# Patient Record
Sex: MALE | Race: WHITE | URBAN - METROPOLITAN AREA
[De-identification: names, ages, dates, MRNs, and addresses within clinical notes are randomized per-mention and may not be internally consistent; named-entity substitution may affect disease eponyms.]

---

## 2017-08-03 ENCOUNTER — HOSPITAL ENCOUNTER (EMERGENCY)
Facility: CLINIC | Age: 22
Discharge: HOME | End: 2017-08-03
Attending: EMERGENCY MEDICINE | Admitting: EMERGENCY MEDICINE
Payer: COMMERCIAL

## 2017-08-03 VITALS
HEIGHT: 70 IN | SYSTOLIC BLOOD PRESSURE: 127 MMHG | HEART RATE: 106 BPM | OXYGEN SATURATION: 100 % | WEIGHT: 200 LBS | TEMPERATURE: 99.1 F | BODY MASS INDEX: 28.63 KG/M2 | DIASTOLIC BLOOD PRESSURE: 72 MMHG | RESPIRATION RATE: 20 BRPM

## 2017-08-03 DIAGNOSIS — J02.9 PHARYNGITIS, UNSPECIFIED ETIOLOGY: ICD-10-CM

## 2017-08-03 LAB
DEPRECATED S PYO AG THROAT QL EIA: NORMAL
MICRO REPORT STATUS: NORMAL
SPECIMEN SOURCE: NORMAL

## 2017-08-03 PROCEDURE — 87081 CULTURE SCREEN ONLY: CPT | Performed by: EMERGENCY MEDICINE

## 2017-08-03 PROCEDURE — 87880 STREP A ASSAY W/OPTIC: CPT | Performed by: EMERGENCY MEDICINE

## 2017-08-03 PROCEDURE — 99283 EMERGENCY DEPT VISIT LOW MDM: CPT

## 2017-08-03 ASSESSMENT — ENCOUNTER SYMPTOMS
CHILLS: 1
SORE THROAT: 1
COUGH: 1
RHINORRHEA: 1
FEVER: 1

## 2017-08-03 NOTE — ED PROVIDER NOTES
"  History     Chief Complaint:  Pharyngitis     HPI   Ben Odonnell is a 21 year old male who presents with pharyngitis. The patient states that he has had a multitude of upper respiratory infection symptoms for the past 24 hours. He is currently experiencing a sore throat, fever, chills, aches, runny nose, cough, and sore throat. He endorses having taken tylenol immediately prior to coming into the emergency department this evening. He denies having any rash or other concerns.    Allergies:  Amoxicillin  Azithromycin    Medications:    The patient is not currently taking any prescribed medications.     Past Medical History:    The patient denies any relevant past medical history.     Past Surgical History:    History reviewed. No pertinent past surgical history.     Family History:    The patient denies any relevant family medical history.     Social History:  The patient was accompanied to the ED by his girlfriend.  Smoking Status: No  Smokeless Tobacco: No  Alcohol Use: No   Marital Status:  Single [1]    Review of Systems   Constitutional: Positive for chills and fever.   HENT: Positive for rhinorrhea and sore throat.    Respiratory: Positive for cough.    Skin: Negative for rash.   All other systems reviewed and are negative.    Physical Exam   Vitals:  Patient Vitals for the past 24 hrs:   BP Temp Temp src Pulse Resp SpO2 Height Weight   08/03/17 0211 127/72 99.1  F (37.3  C) Temporal 106 20 100 % 1.778 m (5' 10\") 90.7 kg (200 lb)      Physical Exam  General: Patient is alert and interactive when I enter the room  Head:  The scalp, face, and head appear normal  Eyes:  The pupils are equal, round, and reactive to light    Conjunctivae and sclerae are normal  ENT:    External acoustic canals are normal    Uvula is in the midline    Erythematous throat. No exudate.   Neck:  Normal range of motion  CV:  Regular rate. S1/S2. No murmurs.   Resp:  Lungs are clear without wheezes or rales. No distress  GI:  Abdomen is " soft, no rigidity, guarding, or rebound    No distension. No tenderness to palpation in any quadrant.     MS:  Normal tone. Joints grossly normal without effusions.     No asymmetric leg swelling, calf or thigh tenderness.      Normal motor assessment of all extremities.  Skin:  No rash or lesions noted. Normal capillary refill noted    Feels hot to the touch  Neuro: Speech is normal and fluent. Face is symmetric.     Moving all extremities well.   Psych:  Awake. Alert.  Normal affect.  Appropriate interactions.  Lymph: Tender anterior lymphadenopathy      Emergency Department Course     Laboratory:  Laboratory findings were communicated with the patient who voiced understanding of the findings.  Rapid strep screen: negative  Beta strep group A culture: pending    Emergency Department Course:  Nursing notes and vitals reviewed.  I performed an exam of the patient as documented above.     0305 I rechecked with the patient    I discussed the treatment plan with the patient. They expressed understanding of this plan and consented to discharge. They will be discharged home with instructions for care and follow up. In addition, the patient will return to the emergency department if their symptoms persist, worsen, if new symptoms arise or if there is any concern.  All questions were answered.     I personally reviewed the laboratory results with the Patient and answered all related questions prior to discharge.    Impression & Plan      Medical Decision Making:  Ben Odonnell is a 21 year old male who presents for evaluation of a sore throat and clinical evidence of pharyngitis.  The rapid strep test is negative, and formal culture has been set up in the lab. There is no clinical evidence of peritonsillar abscess, retropharyngeal abscess, Lemierre's Syndrome, epiglottis, or Desmond's angina. The etiology is most likely viral.   I have recommended treatment with analgesics, and we will await formal culture results.  If the  culture is positive, an ED physician will call the patient to initiate anti-microbial therapy. Return if increasing pain, change in voice, neck pain, vomiting, fever, or shortness of breath. Follow-up with primary physician if not improving in 3-5 days. Given well appearance, I would not test further for other etiologies of serious bacterial infections.     Patient has a concurrent URI, making viral etiologies more likely.  If sore throat persists, mono testing indicated.     Diagnosis:    ICD-10-CM    1. Pharyngitis, unspecified etiology J02.9       Disposition:   Discharged    Scribe Disclosure:  Rocky MCDONALD, am serving as a scribe at 2:29 AM on 8/3/2017 to document services personally performed by Balwinder Ha MD, based on my observations and the provider's statements to me.   [facility] EMERGENCY DEPARTMENT       Balwinder Ha MD  08/03/17 0352

## 2017-08-03 NOTE — ED AVS SNAPSHOT
[facility] Emergency Department    [address]    [facility] [address]    Phone:  [phone]    Fax:  [phone]                                       Ben Odonnell   MRN: [id number]    Department:  [facility] Emergency Department   Date of Visit:  8/3/2017           After Visit Summary Signature Page     I have received my discharge instructions, and my questions have been answered. I have discussed any challenges I see with this plan with the nurse or doctor.    ..........................................................................................................................................  Patient/Patient Representative Signature      ..........................................................................................................................................  Patient Representative Print Name and Relationship to Patient    ..................................................               ................................................  Date                                            Time    ..........................................................................................................................................  Reviewed by Signature/Title    ...................................................              ..............................................  Date                                                            Time

## 2017-08-03 NOTE — DISCHARGE INSTRUCTIONS

## 2017-08-03 NOTE — ED AVS SNAPSHOT
[facility] Emergency Department    [address]    [facility] [address]    Phone:  [phone]    Fax:  [phone]                                       Ben Odonnell   MRN: [id number]    Department:  [facility] Emergency Department   Date of Visit:  8/3/2017           Patient Information     Date Of Birth          1995        Your diagnoses for this visit were:     Pharyngitis, unspecified etiology        You were seen by Balwinder Ha MD.      Follow-up Information     Follow up with [facility], [facility].    Why:  If symptoms worsen    Contact information:    [address] [facility] [address] [phone]          Discharge Instructions          * PHARYNGITIS (Sore Throat),REPORT PENDING    Pharyngitis (sore throat) is often due to a virus, but can also be caused by the  strep  bacteria. This is called  strep throat . Both viral and strep infection can cause throat pain that is worse when swallowing, aching all over with headache and fever. Both types of infections are contagious. They may be spread by coughing, kissing or touching others after touching your mouth or nose, so wash your hands often.  A test has been done to determine whether or not you have strep throat. If it is positive for strep infection you will usually need to take antibiotics. If the test is negative, you probably have a viral pharyngitis, and antibiotic treatment will not help you recover.  HOME CARE:    If your symptoms are severe, rest at home for the first 2-3 days. If you are told that your test is positive for strep, you should be off work and school for the first two days of antibiotic treatment. After that, you will no longer be as contagious.    Children: Use acetaminophen (Tylenol) for fever, fussiness or discomfort. In infants over six months of age, you may use ibuprofen (Children's Motrin) instead of Tylenol. [NOTE: If your child has chronic liver or kidney  disease or ever had a stomach ulcer or GI bleeding, talk with your doctor before using these medicines.]   (Aspirin should never be used in anyone under 18 years of age who is ill with a fever. It may cause severe liver damage.)  Adults: You may use acetaminophen (Tylenol) 650-1000 mg every 6 hours or ibuprofen (Motrin, Advil) 600 mg every 6-8 hours with food to control pain, if you are able to take these medicines. [NOTE: If you have chronic liver or kidney disease or ever had a stomach ulcer or GI bleeding, talk with your doctor before using these medicines.]    Throat lozenges or sprays (Chloraseptic and others), or gargling with warm salt water will reduce throat pain. Dissolve 1/2 teaspoon of salt in 1 glass of warm water. This is especially useful just before meals.     FOLLOW UP with your doctor as advised by our staff if you are not improving over the next week.  GET PROMPT MEDICAL ATTENTION  if any of the following occur:    Fever over 101 F (38.3 C) for more than three days    New or worsening ear pain, sinus pain or headache    Unable to swallow liquids or open your mouth wide due to throat pain    Trouble breathing    Muffled voice    New rash       [id number] [facility] [place], [address] [place] [address]. All rights reserved. This information is not intended as a substitute for professional medical care. Always follow your healthcare professional's instructions.      24 Hour Appointment Hotline       To make an appointment at any [facility], call [phone] ([phone]). If you don't have a family doctor or clinic, we will help you find one. [place] clinics are conveniently located to serve the needs of you and your family.             Review of your medicines      Our records show that you are taking the medicines listed below. If these are incorrect, please call your family doctor or clinic.        Dose / Directions Last dose taken    TYLENOL PO        Refills:  0                 Procedures and tests performed during your visit     Beta strep group A culture    Rapid strep screen      Orders Needing Specimen Collection     None      Pending Results     Date and Time Order Name Status Description    8/3/2017 0230 Beta strep group A culture In process             Pending Culture Results     Date and Time Order Name Status Description    8/3/2017 0230 Beta strep group A culture In process             Pending Results Instructions     If you had any lab results that were not finalized at the time of your Discharge, you can call the ED Lab Result RN at [phone]. You will be contacted by this team for any positive Lab results or changes in treatment. The nurses are available 7 days a week from 10A to 6:30P.  You can leave a message 24 hours per day and they will return your call.        Test Results From Your Hospital Stay        8/3/2017  2:47 AM      Component Results     Component    Specimen Description    Throat    Rapid Strep A Screen    NEGATIVE: No Group A streptococcal antigen detected by immunoassay, await   culture report.      Micro Report Status    FINAL 08/03/2017         8/3/2017  2:47 AM                Clinical Quality Measure: Blood Pressure Screening     Your blood pressure was checked while you were in the emergency department today. The last reading we obtained was  BP: 127/72 . Please read the guidelines below about what these numbers mean and what you should do about them.  If your systolic blood pressure (the top number) is less than 120 and your diastolic blood pressure (the bottom number) is less than 80, then your blood pressure is normal. There is nothing more that you need to do about it.  If your systolic blood pressure (the top number) is 120-139 or your diastolic blood pressure (the bottom number) is 80-89, your blood pressure may be higher than it should be. You should have your blood pressure rechecked within a year by a primary care provider.  If your  "systolic blood pressure (the top number) is 140 or greater or your diastolic blood pressure (the bottom number) is 90 or greater, you may have high blood pressure. High blood pressure is treatable, but if left untreated over time it can put you at risk for heart attack, stroke, or kidney failure. You should have your blood pressure rechecked by a primary care provider within the next 4 weeks.  If your provider in the emergency department today gave you specific instructions to follow-up with your doctor or provider even sooner than that, you should follow that instruction and not wait for up to 4 weeks for your follow-up visit.        Thank you for choosing [facility]       Thank you for choosing [facility] for your care. Our goal is always to provide you with excellent care. Hearing back from our patients is one way we can continue to improve our services. Please take a few minutes to complete the written survey that you may receive in the mail after you visit with us. Thank you!        [employer] Information     [employer] lets you send messages to your doctor, view your test results, renew your prescriptions, schedule appointments and more. To sign up, go to [email or web address] . Click on \"Log in\" on the left side of the screen, which will take you to the Welcome page. Then click on \"Sign up Now\" on the right side of the page.     You will be asked to enter the access code listed below, as well as some personal information. Please follow the directions to create your username and password.     Your access code is: [email or web address]  Expires: 2017  3:07 AM     Your access code will  in 90 days. If you need help or a new code, please call your [facility] clinic or [phone].        Care EveryWhere ID     This is your Care EveryWhere ID. This could be used by other organizations to access your [facility] medical records  [id number]        Equal Access to Services     BERTHA MEJIA AH: Jessica Early, " fab lopez, gael olsonmasimba childress, asim woodward ah. So [facility] [phone].    ATENCIÓN: Si habla español, tiene a castro disposición servicios gratuitos de asistencia lingüística. Llame al [phone].    We comply with applicable federal civil rights laws and [place] laws. We do not discriminate on the basis of race, color, national origin, age, disability sex, sexual orientation or gender identity.            After Visit Summary       This is your record. Keep this with you and show to your community pharmacist(s) and doctor(s) at your next visit.

## 2017-08-05 LAB
BACTERIA SPEC CULT: NORMAL
Lab: NORMAL
MICRO REPORT STATUS: NORMAL
SPECIMEN SOURCE: NORMAL